# Patient Record
Sex: FEMALE | URBAN - METROPOLITAN AREA
[De-identification: names, ages, dates, MRNs, and addresses within clinical notes are randomized per-mention and may not be internally consistent; named-entity substitution may affect disease eponyms.]

---

## 2019-04-25 ENCOUNTER — APPOINTMENT (RX ONLY)
Dept: URBAN - METROPOLITAN AREA CLINIC 154 | Facility: CLINIC | Age: 54
Setting detail: DERMATOLOGY
End: 2019-04-25

## 2019-04-25 DIAGNOSIS — Z41.9 ENCOUNTER FOR PROCEDURE FOR PURPOSES OTHER THAN REMEDYING HEALTH STATE, UNSPECIFIED: ICD-10-CM

## 2019-04-25 PROCEDURE — ? COSMETIC CONSULTATION: SKIN TIGHTENING

## 2019-04-25 PROCEDURE — ? COSMETIC CONSULTATION: SKIN CARE PRODUCTS AND SERVICES

## 2019-04-25 PROCEDURE — ? COSMETIC CONSULTATION: PALOMAR LASER

## 2019-04-25 PROCEDURE — ? COSMETIC CONSULTATION - MICRO-NEEDLING

## 2019-04-25 PROCEDURE — ? COSMETIC CONSULTATION: LASER RESURFACING

## 2019-05-03 ENCOUNTER — RX ONLY (OUTPATIENT)
Age: 54
Setting detail: RX ONLY
End: 2019-05-03

## 2019-05-03 RX ORDER — ACYCLOVIR 400 MG/1
TABLET ORAL
Qty: 14 | Refills: 0 | Status: ERX | COMMUNITY
Start: 2019-05-03

## 2019-05-03 RX ORDER — MINOCYCLINE HYDROCHLORIDE 100 MG/1
TABLET ORAL
Qty: 28 | Refills: 0 | Status: ERX | COMMUNITY
Start: 2019-05-03

## 2019-05-06 ENCOUNTER — APPOINTMENT (RX ONLY)
Dept: URBAN - METROPOLITAN AREA CLINIC 154 | Facility: CLINIC | Age: 54
Setting detail: DERMATOLOGY
End: 2019-05-06

## 2019-05-06 DIAGNOSIS — Z41.9 ENCOUNTER FOR PROCEDURE FOR PURPOSES OTHER THAN REMEDYING HEALTH STATE, UNSPECIFIED: ICD-10-CM

## 2019-05-06 PROCEDURE — ? FRACTIONAL ERBIUM ABLATIVE LASER

## 2019-05-06 PROCEDURE — ? MICRONEEDLING

## 2019-05-06 PROCEDURE — ? FRAXEL

## 2019-05-06 ASSESSMENT — LOCATION SIMPLE DESCRIPTION DERM
LOCATION SIMPLE: RIGHT CHEEK
LOCATION SIMPLE: LEFT FOREHEAD
LOCATION SIMPLE: CHIN
LOCATION SIMPLE: LEFT CHEEK

## 2019-05-06 ASSESSMENT — LOCATION DETAILED DESCRIPTION DERM
LOCATION DETAILED: RIGHT INFERIOR CENTRAL MALAR CHEEK
LOCATION DETAILED: LEFT MEDIAL FOREHEAD
LOCATION DETAILED: LEFT CHIN
LOCATION DETAILED: LEFT INFERIOR CENTRAL MALAR CHEEK

## 2019-05-06 ASSESSMENT — LOCATION ZONE DERM: LOCATION ZONE: FACE

## 2019-05-06 NOTE — PROCEDURE: MICRONEEDLING
Treatment Number (Optional): 1
Depth In Mm: 0.1
Location #1: full face
Consent: Written consent obtained, risks reviewed including but not limited to pain, scarring, infection and incomplete improvement.  Patient understands the procedure is cosmetic in nature and will require out of pocket payment.
Detail Level: Zone
Price (Use Numbers Only, No Special Characters Or $): 0
Depth In Mm: 2
Infusions (Optional): PRP
Post-Care Instructions: After the procedure, take precautions agains sun exposure. Do not apply sunscreen for 12 hours after the procedure. Do not apply make-up for 12 hours after the procedure. Avoid alcohol based toners for 10-14 days. After 2-3 days patients can return to their regular skin regimen.

## 2019-05-06 NOTE — PROCEDURE: FRACTIONAL ERBIUM ABLATIVE LASER
Pre-Procedure Text: After consent was obtained and the procedure was explained, all persons present in the exam room put on their protective eyewear. Cold gel was applied to the treatment areas.
Number Of Passes: 4
Treatment Site: full face
Consent: Written consent obtained, risks reviewed including but not limited to crusting, scabbing, blistering, scarring, darker or lighter pigmentary change, and/or incomplete removal.
Post-Procedure Text: Following the treatment, ice, aquaphor, and sunblock was applied to the treatment areas.  Post-care instructions were discussed.
Energy In Mj/Cm2: 5/0/0 - 5/3/9
Detail Level: Zone
Spot Size: groove tip
Procedural Text: The treatment areas where then treated as noted above.
Post-Care Instructions: I reviewed with the patient in detail post-care instructions. Patient is to apply vaseline with a q-tip to all crusted areas, and avoid picking at any scabs. Pt should stay away from the sun and wear sun protection until fully healed.
Energy In Mj/Cm2: 9/0/0 9/3/5
External Cooling Fan Speed: 5
Spot Size: blue tip

## 2019-05-06 NOTE — PROCEDURE: FRAXEL
Post-Care Instructions: Treatment area washed with gentle cleanser after treatment.  SPF 30 applied post treatment.  Cool gel pack provided to patient. \\nI reviewed with the patient in detail post-care instructions. Patient should avoid sun until area fully healed.
Number Of Passes: 0
Location: full face
Anesthesia Volume In Cc: 1
Anesthesia Type: 1% lidocaine with epinephrine
Indication: resurfacing
Location: full face except eyelids
Treatment Level: 4
Energy(Mj/Cm2): 10
Energy(Mj/Cm2): 40
External Cooling: Low Cryo 5
Topical Anesthesia Type: 23% Lidocaine 7% Tetracaine
Treatment Level: 6
External Cooling Fan Speed: 5
Length Of Topical Anesthesia Application (Optional): 30 minutes
Consent: Written consent obtained, risks reviewed including but not limited to pain and incomplete improvement.\\n\\nPatient has not used any retinoids or glycolic acid in last 2 weeks, has not used a depilatory cream or wad in 2 weeks, has not used Accutane in last 6 months.  \\n\\nLaser Check pre-procedure:\\nOptical window on laser hand piece clean with no scratches, burns, pits or debris.\\nOptical window on treatment tip clean with no scratches, pits, or debris.
Wavelength: 1927nm
Detail Level: Simple
Wavelength: 1550nm
Add Post-Care Below To The Note: No